# Patient Record
Sex: FEMALE | Race: WHITE
[De-identification: names, ages, dates, MRNs, and addresses within clinical notes are randomized per-mention and may not be internally consistent; named-entity substitution may affect disease eponyms.]

---

## 2017-12-10 ENCOUNTER — HOSPITAL ENCOUNTER (EMERGENCY)
Dept: HOSPITAL 92 - ERS | Age: 82
Discharge: HOME | End: 2017-12-10
Payer: MEDICARE

## 2017-12-10 DIAGNOSIS — F32.9: ICD-10-CM

## 2017-12-10 DIAGNOSIS — R19.7: ICD-10-CM

## 2017-12-10 DIAGNOSIS — E03.9: ICD-10-CM

## 2017-12-10 DIAGNOSIS — F41.9: ICD-10-CM

## 2017-12-10 DIAGNOSIS — I10: ICD-10-CM

## 2017-12-10 DIAGNOSIS — I48.91: ICD-10-CM

## 2017-12-10 DIAGNOSIS — Z79.899: ICD-10-CM

## 2017-12-10 DIAGNOSIS — K21.9: ICD-10-CM

## 2017-12-10 DIAGNOSIS — R11.0: Primary | ICD-10-CM

## 2017-12-10 DIAGNOSIS — Z79.82: ICD-10-CM

## 2017-12-10 LAB
ANION GAP SERPL CALC-SCNC: 11 MMOL/L (ref 10–20)
BASOPHILS # BLD AUTO: 0 THOU/UL (ref 0–0.2)
BASOPHILS NFR BLD AUTO: 0.2 % (ref 0–1)
BUN SERPL-MCNC: 15 MG/DL (ref 9.8–20.1)
CALCIUM SERPL-MCNC: 8.6 MG/DL (ref 7.8–10.44)
CHLORIDE SERPL-SCNC: 106 MMOL/L (ref 98–107)
CO2 SERPL-SCNC: 21 MMOL/L (ref 23–31)
CREAT CL PREDICTED SERPL C-G-VRATE: 0 ML/MIN (ref 70–130)
EOSINOPHIL # BLD AUTO: 0.1 THOU/UL (ref 0–0.7)
EOSINOPHIL NFR BLD AUTO: 1.4 % (ref 0–10)
HCT VFR BLD CALC: 36.7 % (ref 36–47)
LYMPHOCYTES # BLD: 0.6 THOU/UL (ref 1.2–3.4)
LYMPHOCYTES NFR BLD AUTO: 10.8 % (ref 21–51)
MONOCYTES # BLD AUTO: 0.6 THOU/UL (ref 0.11–0.59)
MONOCYTES NFR BLD AUTO: 10.7 % (ref 0–10)
NEUTROPHILS # BLD AUTO: 4.4 THOU/UL (ref 1.4–6.5)
RBC # BLD AUTO: 4.06 MILL/UL (ref 4.2–5.4)
WBC # BLD AUTO: 5.8 THOU/UL (ref 4.8–10.8)

## 2017-12-10 PROCEDURE — 85025 COMPLETE CBC W/AUTO DIFF WBC: CPT

## 2017-12-10 PROCEDURE — 36415 COLL VENOUS BLD VENIPUNCTURE: CPT

## 2017-12-10 PROCEDURE — 93005 ELECTROCARDIOGRAM TRACING: CPT

## 2017-12-10 PROCEDURE — 80048 BASIC METABOLIC PNL TOTAL CA: CPT

## 2017-12-30 NOTE — EKG
Test Reason : SYNCOPE

Blood Pressure : ***/*** mmHG

Vent. Rate : 084 BPM     Atrial Rate : 084 BPM

   P-R Int : 164 ms          QRS Dur : 098 ms

    QT Int : 370 ms       P-R-T Axes : 013 -22 057 degrees

   QTc Int : 437 ms

 

Normal sinus rhythm

Moderate voltage criteria for LVH, may be normal variant

Borderline ECG

 

Confirmed by TIERA PERSAUD ([id number]),  MONIQUE CASTANEDA ([id number]) on 12/30/2017 1:48:58 PM

 

Referred By:             Confirmed By:TIERA PERSAUD

## 2018-08-04 ENCOUNTER — HOSPITAL ENCOUNTER (OUTPATIENT)
Dept: HOSPITAL 92 - ERS | Age: 83
Setting detail: OBSERVATION
LOS: 1 days | Discharge: HOME | End: 2018-08-05
Attending: INTERNAL MEDICINE | Admitting: INTERNAL MEDICINE
Payer: MEDICARE

## 2018-08-04 DIAGNOSIS — D64.9: ICD-10-CM

## 2018-08-04 DIAGNOSIS — E03.9: ICD-10-CM

## 2018-08-04 DIAGNOSIS — Z79.82: ICD-10-CM

## 2018-08-04 DIAGNOSIS — Z79.899: ICD-10-CM

## 2018-08-04 DIAGNOSIS — K21.9: ICD-10-CM

## 2018-08-04 DIAGNOSIS — E78.5: ICD-10-CM

## 2018-08-04 DIAGNOSIS — I16.0: ICD-10-CM

## 2018-08-04 DIAGNOSIS — R07.9: Primary | ICD-10-CM

## 2018-08-04 DIAGNOSIS — Z88.2: ICD-10-CM

## 2018-08-04 DIAGNOSIS — I25.10: ICD-10-CM

## 2018-08-04 DIAGNOSIS — Z91.041: ICD-10-CM

## 2018-08-04 DIAGNOSIS — Z79.02: ICD-10-CM

## 2018-08-04 LAB
ALBUMIN SERPL BCG-MCNC: 4 G/DL (ref 3.4–4.8)
ALP SERPL-CCNC: 83 U/L (ref 40–150)
ALT SERPL W P-5'-P-CCNC: 7 U/L (ref 8–55)
ANION GAP SERPL CALC-SCNC: 16 MMOL/L (ref 10–20)
AST SERPL-CCNC: 15 U/L (ref 5–34)
BASOPHILS # BLD AUTO: 0 THOU/UL (ref 0–0.2)
BASOPHILS NFR BLD AUTO: 0.5 % (ref 0–1)
BILIRUB SERPL-MCNC: 0.6 MG/DL (ref 0.2–1.2)
BUN SERPL-MCNC: 9 MG/DL (ref 9.8–20.1)
CALCIUM SERPL-MCNC: 9.7 MG/DL (ref 7.8–10.44)
CHLORIDE SERPL-SCNC: 106 MMOL/L (ref 98–107)
CK MB SERPL-MCNC: 0.7 NG/ML (ref 0–6.6)
CO2 SERPL-SCNC: 22 MMOL/L (ref 23–31)
CREAT CL PREDICTED SERPL C-G-VRATE: 0 ML/MIN (ref 70–130)
EOSINOPHIL # BLD AUTO: 0 THOU/UL (ref 0–0.7)
EOSINOPHIL NFR BLD AUTO: 0.3 % (ref 0–10)
GLOBULIN SER CALC-MCNC: 3.3 G/DL (ref 2.4–3.5)
GLUCOSE SERPL-MCNC: 112 MG/DL (ref 83–110)
HGB BLD-MCNC: 11.6 G/DL (ref 12–16)
LYMPHOCYTES # BLD: 1.3 THOU/UL (ref 1.2–3.4)
LYMPHOCYTES NFR BLD AUTO: 21.3 % (ref 21–51)
MCH RBC QN AUTO: 28.8 PG (ref 27–31)
MCV RBC AUTO: 87.8 FL (ref 78–98)
MONOCYTES # BLD AUTO: 0.4 THOU/UL (ref 0.11–0.59)
MONOCYTES NFR BLD AUTO: 7.3 % (ref 0–10)
NEUTROPHILS # BLD AUTO: 4.3 THOU/UL (ref 1.4–6.5)
NEUTROPHILS NFR BLD AUTO: 70.6 % (ref 42–75)
PLATELET # BLD AUTO: 262 THOU/UL (ref 130–400)
POTASSIUM SERPL-SCNC: 3.5 MMOL/L (ref 3.5–5.1)
RBC # BLD AUTO: 4.03 MILL/UL (ref 4.2–5.4)
SODIUM SERPL-SCNC: 140 MMOL/L (ref 136–145)
TROPONIN I SERPL DL<=0.01 NG/ML-MCNC: (no result) NG/ML (ref ?–0.03)
WBC # BLD AUTO: 6 THOU/UL (ref 4.8–10.8)

## 2018-08-04 PROCEDURE — 82553 CREATINE MB FRACTION: CPT

## 2018-08-04 PROCEDURE — 93005 ELECTROCARDIOGRAM TRACING: CPT

## 2018-08-04 PROCEDURE — 71045 X-RAY EXAM CHEST 1 VIEW: CPT

## 2018-08-04 PROCEDURE — 36415 COLL VENOUS BLD VENIPUNCTURE: CPT

## 2018-08-04 PROCEDURE — 85025 COMPLETE CBC W/AUTO DIFF WBC: CPT

## 2018-08-04 PROCEDURE — 80061 LIPID PANEL: CPT

## 2018-08-04 PROCEDURE — G0378 HOSPITAL OBSERVATION PER HR: HCPCS

## 2018-08-04 PROCEDURE — 80053 COMPREHEN METABOLIC PANEL: CPT

## 2018-08-04 PROCEDURE — 83880 ASSAY OF NATRIURETIC PEPTIDE: CPT

## 2018-08-04 PROCEDURE — 84484 ASSAY OF TROPONIN QUANT: CPT

## 2018-08-04 PROCEDURE — 99285 EMERGENCY DEPT VISIT HI MDM: CPT

## 2018-08-04 RX ADMIN — NITROGLYCERIN SCH: 20 OINTMENT TOPICAL at 14:43

## 2018-08-04 NOTE — HP
PRIMARY CARE PHYSICIAN:  [facility] call admission.

 

REASON FOR ADMISSION:  Chest pain and hypertensive urgency.

 

HISTORY OF PRESENT ILLNESS:  This is an 85-year-old female, who has underlying history of hypertensio
n, GERD, hypothyroidism, coronary artery disease, who lives alone at home.  The patient was checking 
her blood pressure at home yesterday, which was very high.  Last night, the patient's blood pressure 
was running in 200s.  She took amlodipine and aspirin and after that she slept.  This morning, when s
he woke up, at that time her blood pressure was still very high.  She took another couple of the baby
 aspirin and amlodipine, but that was not dropping her blood pressure and she was feeling chest press
ure.  She called her primary care physician, who advised her to go to emergency room for checkout.  I
n the emergency room when she presented, at that time her blood pressure was 174/73.  She was chest p
ain free.  Her routine workup in the emergency room was unremarkable including CBC, BMP, and chest x-
ray.  Patient reports that she had negative cardiac stress test in 05/2018.  Patient had, 20 years ag
o, cardiac catheterization and 3 stents placed in [place] by her cardiologist.  The patient was recen
tly followed up with primary care physician as well as her cardiologist and everything was fine.  Patricia godwin's blood pressure was lately running high.  Patient reports that she is also anxious and she took
 anxiety medicine that did not change her blood pressure.  She denies any associated diaphoresis, but
 she was feeling nausea and headache.  She denies any focal motor or sensory symptoms.  She was feeli
ng headache with high blood pressure.  She denies any UTI symptoms.  She denies any constipation, ro
rrhea, melena, or hematochezia.

 

REVIEW OF SYSTEMS:  The following complete review of systems was negative, unless otherwise mentioned
 in the HPI or below:  Constitutional:  Weight loss or gain, ability to conduct usual activities.  Sk
in:  Rash, itching.  Eyes:  Double vision, pain.  ENT/Mouth:  Nose bleeding, neck stiffness, pain, te
nderness.  Cardiovascular:  Palpitations, dyspnea on exertion, orthopnea.  Respiratory:  Shortness of
 breath, wheezing, cough, hemoptysis, fever, or night sweats.  Gastrointestinal:  Poor appetite, abdo
steve pain, heartburn, nausea, vomiting, constipation, or diarrhea.  Genitourinary:  Urgency, frequen
cy, dysuria, nocturia.  Musculoskeletal:  Pain, swelling.  Neurologic/Psychiatric:  Anxiety, depressi
on.  Allergy/Immunologic:  Skin rash, bleeding tendency.  Please see my HPI for pertinent positive an
d negative.  All other review of system reviewed and negative except as mentioned in the HPI.

 

PAST MEDICAL HISTORY:  Coronary artery disease with stent; hypertension; dyslipidemia; hypothyroidism
; gastroesophageal reflux disease; history of bladder cancer, treated with surgery.

 

PAST SURGICAL HISTORY:  Throat surgery after injury, hysterectomy, left leg surgery, bladder surgery 
x3, tonsillectomy.

 

PAST PSYCHIATRIC HISTORY:  Anxiety and depression.

 

SOCIAL HISTORY:  Patient lives alone by herself in [place], [place].  No history of tobacco, alcohol
, or illicit drug abuse.

 

FAMILY HISTORY:  No strong family history of premature coronary artery disease, stroke, or cancer.

 

EMERGENCY ROOM COURSE:  The patient is given aspirin 325 mg.

 

ALLERGIES:  IODINE, SULFA, MILK PRODUCT.

 

CURRENT HOME MEDICATIONS:  Toprol-XL 12.5 mg twice daily, omeprazole 20 mg p.o. daily, Synthroid 50 m
cg p.o. daily, aspirin 81 mg p.o. daily, Xanax 0.5 mg as needed, Bentyl 10 mg q.8 hourly p.r.n., Zofr
an 4 mg q.6 hourly p.r.n., amlodipine 5 mg p.o. daily.

 

PHYSICAL EXAMINATION:

VITAL SIGNS:  Blood pressure currently 163/92, pulse 69, respiratory rate 17, temperature 97.8, satur
ation 98% on room air, weight is 63.9 kilograms.

GENERAL:  Patient is currently alert, oriented, in no acute distress.

HEENT:  Head:  Normocephalic, atraumatic.  Eyes:  Pupils round, reactive to light.  Extraocular muscl
e intact.  ENT:  Oropharynx within normal limits.  Moist mucous membranes.  No oral lesion, no pharyn
geal erythema, no exudate.

NECK:  Supple, no JVD, no thyromegaly, no carotid bruit, no jugular venous distention.

LUNGS:  Clear to auscultation without any rhonchi or rales.

CARDIAC:  S1 and S2, regular.  No murmur, no gallop, no rub.

ABDOMEN:  Soft, bowel sounds present, nontender, nondistended.  No organomegaly, no mass, no suprapub
ic tenderness.

BACK EXAMINATION:  Unremarkable, no CVA tenderness.

EXTREMITIES:  Upper extremities, passive movement of all joints are normal.  Lower extremities:  No e
manohar.  Good peripheral pulsation.

SKIN:  No skin rash.

HEMATOLOGICAL SYSTEM:  No lymphadenopathy.

PSYCHIATRIC:  Normal affect.

NEUROLOGIC:  Nonfocal examination.

 

SIGNIFICANT LABORATORY DATA:  CBC:  WBC 6.0, hemoglobin 11.6, platelets 262.  BMP:  Sodium 140, potas
sium 3.5, chloride 106, carbon dioxide 22, anion gap 16, BUN 9, creatinine 0.84, glucose 112, calcium
 9.7.  LFT:  AST 15, ALT 7, alkaline phosphatase is 83, albumin 4.0.  BNP is 36.3.  Cardiac enzymes n
egative.  Chest x-ray, based on my review, no acute cardiopulmonary process.  EKG, based on my review
, normal sinus rhythm, LVH.

 

ASSESSMENT AND PLAN:

1.  Chest pressure, rule out acute coronary syndrome.  The patient will be observed on telemetry floo
r.  We will do serial cardiac enzymes x3.  She had negative stress test per patient in 05/2018 and fazal han is not interested in going for another stress test.  If her cardiac enzymes remain negative, then harry han will consider discharging her home tomorrow.  Most likely, chest pressure is explained by her uncon
trolled hypertension or gastroesophageal reflux disease.  We will continue with Protonix 40 mg p.o. d
aily.

2.  Hypertension, uncontrolled.  We will change amlodipine to Procardia-XL 30 mg p.o. daily and we wi
ll monitor blood pressure more frequently.  We will continue metoprolol XL 25 mg p.o. daily.

3.  Gastroesophageal reflux disease.  We will continue Protonix 40 mg p.o. daily.

4.  Hypothyroidism.  We will continue Synthroid 50 mcg p.o. daily.

5.  Anxiety and depression.  We will use Xanax 0.25 mg p.o. q.i.d. p.r.n.

6.  Deep venous thrombosis prophylaxis not needed.

7.  Gastrointestinal prophylaxis, Protonix 40 mg p.o. daily.

 

CODE STATUS:  The patient is FULL CODE.  Patient's daughter is surrogate decision maker.

 

DISPOSITION PLAN:  Within 24 hours, likely tomorrow.

## 2018-08-04 NOTE — RAD
RADIOGRAPH CHEST 1 VIEW:

 

HISTORY: 

85-year-old female with hypertension. 

 

FINDINGS:

There are no air space densities, pulmonary edema, pneumothorax, or cardiomegaly.  The lateral costop
hrenic angles are sharp.

 

There is a cluster of surgical clips at the left medial cervicothoracic junction. Thin layer of calci
fication over the left hemidiaphragm. 

 

IMPRESSION:  

No acute cardiopulmonary findings.

 

 

naeem [] 

 

POS: APRIL

## 2018-08-05 VITALS — SYSTOLIC BLOOD PRESSURE: 142 MMHG | TEMPERATURE: 97.7 F | DIASTOLIC BLOOD PRESSURE: 68 MMHG

## 2018-08-05 LAB
CHD RISK SERPL-RTO: 3.3 (ref ?–4.5)
CHOLEST SERPL-MCNC: 212 MG/DL
HDLC SERPL-MCNC: 65 MG/DL
LDLC SERPL CALC-MCNC: 135 MG/DL
TRIGL SERPL-MCNC: 61 MG/DL (ref ?–150)

## 2018-08-05 RX ADMIN — NITROGLYCERIN SCH: 20 OINTMENT TOPICAL at 03:02

## 2018-08-05 RX ADMIN — NITROGLYCERIN SCH: 20 OINTMENT TOPICAL at 06:38

## 2018-08-05 NOTE — DIS
DATE OF ADMISSION:  08/04/2018

 

DATE OF DISCHARGE:  08/05/2018

 

PRIMARY CARE PHYSICIAN:  [facility] call admission.  Her primary care physician in [place], [place].

 

DISCHARGE DISPOSITION:  Home.

 

PRIMARY DISCHARGE DIAGNOSES:  Chest pain, ruled out acute coronary syndrome, likely due to anxiety.

 

SECONDARY DISCHARGE DIAGNOSES:  Hypothyroidism, hypertension, gastroesophageal reflux disease, dyslip
idemia, coronary artery disease, anxiety and depression, normocytic normochromic anemia.

 

PRIMARY PROCEDURES AND OPERATIONS:  None.

 

RADIOLOGICAL INVESTIGATION:  Chest x-ray normal.

 

SIGNIFICANT LABORATORY DATA:  WBC 6.0, hemoglobin 11.6, platelet 262.  Sodium 140, potassium 3.5, BUN
 9, creatinine 0.84, calcium 9.7.  LFT normal.  Cardiac enzymes negative x3.  BNP 36.3.  .

 

DISCHARGE MEDICATIONS:  Xanax 0.5 mg p.o. t.i.d. p.r.n., amlodipine 2.5 mg p.o. daily, enalapril 20 m
g p.o. at bedtime, Synthroid 50 mcg p.o. daily, Toprol-XL 25 mg p.o. at bedtime, omeprazole 20 mg p.o
. daily, Lipitor 10 mg p.o. at bedtime.

 

CONTRAINDICATIONS:  None.

 

CODE STATUS:  FULL CODE.

 

INPATIENT CONSULTANT:  None.

 

ALLERGIES:  IODINE, SULFA.

 

DISCHARGE PLAN:  Post hospital, the patient will follow up with primary care physician in 1 week.

 

HOSPITAL COURSE:  An 85-year-old female with above-mentioned medical problem who was admitted in hosp
[facility] yesterday by me.  Please see my HPI for further detail.  This patient was having difficulty cont
rolling blood pressure at home.  She was having high blood pressure at home and she was taking extra 
doses of blood pressure medication and with high blood pressure, she was experiencing chest pressure 
without any significant radiation, associated with nausea and headache.  This patient's examination w
as completely normal.  Her chest x-ray was normal.  Her routine blood test was also unremarkable.  Farrah han was observed on telemetry floor.  She had negative stress test done in May of this year.  With cont
rolling blood pressure, she did not have any further chest pain.  Her telemetry remained unremarkable
.  During this admission, we did not make any change in her medication regarding her blood pressure, 
because her anxiety was precipitating her high blood pressure and that is why we necessarily provided
 the patient education about how to cope with anxiety and how to manage the blood pressure.  We did n
ot pursue any further stress test during this admission as she had recently with her cardiologist and
 the patient was also not interested in going for repeat stress test.  Her LDL was high and that is w
hy we prescribed Lipitor upon discharge.  The rest of medication was continued as per previous.

 

The patient is seen and examined at bedside today.  All review of system reviewed with her and negati
ve.

 

PHYSICAL EXAMINATION:

VITAL SIGNS:  Today, temperature 97.7, pulse 73, respiratory rate 16, saturation 98% on room air, blo
od pressure 142/68, weight 136 pounds.

GENERAL:  The patient is currently alert, awake, no acute distress.

HEAD:  Normocephalic, atraumatic.

EYES:  Pupils round, reactive to light.  Extraocular muscle intact.

ENT:  Oropharynx within normal limits.

LUNGS:  Clear.

CARDIAC:  S1, S2 regular without any murmur.

ABDOMEN:  Soft and benign.

EXTREMITIES:  No edema.

NEUROLOGIC:  Nonfocal examination.